# Patient Record
(demographics unavailable — no encounter records)

---

## 2024-10-21 NOTE — HISTORY OF PRESENT ILLNESS
[de-identified] : Patient is a 59-year-old male who presents today for an evaluation of both his left and right knee.  This is all stems from a Worker's Comp. injury for which she sustained injuries.  He underwent a right total knee replacement on January 4, 2020.  He also has some discomfort within his left knee.  On his last office visit he was referred for an MRI.  He states that he still feels discomfort mostly with any strenuous activities and does feel some crepitus at times.  Lots of the right knee.  He states that he feels an increase discomfort with a clicking sensation and feeling of giving way.  He denies any specific injury or accident.  Presents today for evaluation of both knees.

## 2024-10-21 NOTE — PHYSICAL EXAM
[Antalgic] : antalgic [LE] : Sensory: Intact in bilateral lower extremities [ALL] : dorsalis pedis, posterior tibial, femoral, popliteal, and radial 2+ and symmetric bilaterally [de-identified] : On physical examination of the left knee.  The skin is clean dry and intact with no areas of redness swelling or heat noted.  There is some diffuse pain and tenderness particularly in the patellofemoral as well as the medial femoral-tibial compartment.  He does have good range of motion neurovascularly intact with no evidence of ligamentous laxity.  There is no evidence of any motor or sensory deficit.  Patient has good distal pulses with no calf tenderness.  On physical examination of the right knee.  Patient is status post right total knee replacement.  There is a well-healed surgical scar with no evidence of infection superficial or deep.  There is no redness heat discharge fever noted.  There is some diffuse pain and tenderness primarily in the patellofemoral compartment as well as along the medial femoral-tibial compartment.  He does have adequate range of motion and is neurovascularly intact.  No evidence of ligamentous laxity however there is some crepitus upon evaluation with a clicking sensation.  There is no evidence of any motor or sensory deficit.  Patient has good distal pulses and no calf tenderness. [de-identified] : X-rays of the right knee were taken today. This includes 3 views. The knee AP, lateral anbd sunrise views. They reveal a status post right total knee replacement.  The hardware is in place and shows excellent alignment as well as fixation. There is no evidence of any fracture, dislocation or loosening of any hardware.

## 2024-10-21 NOTE — PHYSICAL EXAM
[Antalgic] : antalgic [LE] : Sensory: Intact in bilateral lower extremities [ALL] : dorsalis pedis, posterior tibial, femoral, popliteal, and radial 2+ and symmetric bilaterally [de-identified] : On physical examination of the left knee.  The skin is clean dry and intact with no areas of redness swelling or heat noted.  There is some diffuse pain and tenderness particularly in the patellofemoral as well as the medial femoral-tibial compartment.  He does have good range of motion neurovascularly intact with no evidence of ligamentous laxity.  There is no evidence of any motor or sensory deficit.  Patient has good distal pulses with no calf tenderness.  On physical examination of the right knee.  Patient is status post right total knee replacement.  There is a well-healed surgical scar with no evidence of infection superficial or deep.  There is no redness heat discharge fever noted.  There is some diffuse pain and tenderness primarily in the patellofemoral compartment as well as along the medial femoral-tibial compartment.  He does have adequate range of motion and is neurovascularly intact.  No evidence of ligamentous laxity however there is some crepitus upon evaluation with a clicking sensation.  There is no evidence of any motor or sensory deficit.  Patient has good distal pulses and no calf tenderness. [de-identified] : X-rays of the right knee were taken today. This includes 3 views. The knee AP, lateral anbd sunrise views. They reveal a status post right total knee replacement.  The hardware is in place and shows excellent alignment as well as fixation. There is no evidence of any fracture, dislocation or loosening of any hardware.

## 2024-10-21 NOTE — DISCUSSION/SUMMARY
[de-identified] : After thorough history full examination and review of the x-rays.  It was explained to the patient he is doing fairly well in both his left and right knee.  And that the prosthesis is in place with good alignment and fixation.  However due to his complaints of discomfort.  It was suggested that he receive a MRI.  This is to evaluate the prosthesis but also to evaluate the surrounding soft tissue.  He is encouraged to continue with exercises along with ice pack last moist heat and anti-inflammatories as needed.  And to return back to the office once the MRI is performed to discuss its findings.  35 minutes were spent, face to face, in direct consultation with the patient. This includes reviewing the natural history of their Dx., eliciting the history, performing an orthopedic exam, review of the x-ray findings, forming a differential Dx and discussing all treatment options. This Includes both surgical and non-surgical treatments. I also reviewed all the risks and benefits of non-operative & operative Tx options, future impact into orthopedic functions/problems, activity restrictions both at home and at work, and all follow up requirements.

## 2024-10-21 NOTE — DISCUSSION/SUMMARY
[de-identified] : After thorough history full examination and review of the x-rays.  It was explained to the patient he is doing fairly well in both his left and right knee.  And that the prosthesis is in place with good alignment and fixation.  However due to his complaints of discomfort.  It was suggested that he receive a MRI.  This is to evaluate the prosthesis but also to evaluate the surrounding soft tissue.  He is encouraged to continue with exercises along with ice pack last moist heat and anti-inflammatories as needed.  And to return back to the office once the MRI is performed to discuss its findings.  35 minutes were spent, face to face, in direct consultation with the patient. This includes reviewing the natural history of their Dx., eliciting the history, performing an orthopedic exam, review of the x-ray findings, forming a differential Dx and discussing all treatment options. This Includes both surgical and non-surgical treatments. I also reviewed all the risks and benefits of non-operative & operative Tx options, future impact into orthopedic functions/problems, activity restrictions both at home and at work, and all follow up requirements.

## 2024-10-21 NOTE — HISTORY OF PRESENT ILLNESS
[de-identified] : Patient is a 59-year-old male who presents today for an evaluation of both his left and right knee.  This is all stems from a Worker's Comp. injury for which she sustained injuries.  He underwent a right total knee replacement on January 4, 2020.  He also has some discomfort within his left knee.  On his last office visit he was referred for an MRI.  He states that he still feels discomfort mostly with any strenuous activities and does feel some crepitus at times.  Lots of the right knee.  He states that he feels an increase discomfort with a clicking sensation and feeling of giving way.  He denies any specific injury or accident.  Presents today for evaluation of both knees.

## 2025-03-04 NOTE — PHYSICAL EXAM
[Antalgic] : antalgic [LE] : Sensory: Intact in bilateral lower extremities [ALL] : dorsalis pedis, posterior tibial, femoral, popliteal, and radial 2+ and symmetric bilaterally [Normal RUE] : Right Upper Extremity: No scars, rashes, lesions, ulcers, skin intact [Normal LUE] : Left Upper Extremity: No scars, rashes, lesions, ulcers, skin intact [Normal RLE] : Right Lower Extremity: No scars, rashes, lesions, ulcers, skin intact [Normal LLE] : Left Lower Extremity: No scars, rashes, lesions, ulcers, skin intact [Normal] : No costovertebral angle tenderness and no spinal tenderness [de-identified] : On physical examination of the right knee.  Patient is status post right total knee replacement on January 4, 2023.  There is a well-healed surgical scar with no evidence of infection superficial or deep.  There is no redness swelling heat discharge noted.  There is some diffuse pain and tenderness primarily in the patellofemoral compartment.  He does have adequate range of motion but with discomfort.  Range of motion 0-1 35.  Patient does have some significant laxity to varus valgus stresses in extension and flexion.  Slight anterior drawer.  Calves are soft, nontender, no evidence of DVT at this time.  Patient is good motor and sensory to both leg.   [de-identified] : No x-rays were performed at today's office visit  MRI review does not show any significant periprosthetic issues.  Good overall alignment no significant effusion.  No synovitis

## 2025-03-04 NOTE — HISTORY OF PRESENT ILLNESS
[de-identified] : Patient is a 59-year-old male who presents today for right knee MRI review.  This is all due to from a Worker's Comp. injury that he sustained on October 8, 2021.  He is status post right total knee replacement on January 4, 2023.  Overall he does feel some discomfort to the right knee.  Which is worse with any strenuous activities.  This includes stair climbing as well as prolonged standing and walking.  Although he does feel an improvement following his knee replacement.  He states that stiffness still occurs in his right.  With a feeling of giving way at times on his left.  The patient does say that he has fallen a few times.  We did get an MRI and is here for review. [FreeTextEntry1] : Intermittent swelling, instability

## 2025-03-04 NOTE — DISCUSSION/SUMMARY
[Medication Risks Reviewed] : Medication risks reviewed [Surgical risks reviewed] : Surgical risks reviewed [de-identified] : The patient is status post right total knee replacement January 4, 2023.  According to the physical exam patient does seem very lax both in extension and flexion.  Patient does report instability to the knee and lately, frequent falls.  In my opinion patient would benefit from revision right total knee replacement with changing the bearing, lysis of adhesions/scar tissue removal in and around the patella component, revision of the femur so we would be able to use a bearing with a V VC plus for stability.  The risks, benefits, alternatives, and immediate goals of revision surgery were gone over the patient in depth and at length.  Patient understands the multiple risks including and not limited to implant related, medical related, infectious related, and rehabilitative related all of which she would be willing to accept.  All of the patients questions were thoroughly answered to his satisfaction.  Patient will book right revision surgery at his earliest convenience.

## 2025-06-10 NOTE — HISTORY OF PRESENT ILLNESS
[___ Weeks Post Op] : [unfilled] weeks post op [Clean/Dry/Intact] : clean, dry and intact [Healed] : healed [Erythema] : not erythematous [Discharge] : absent of discharge [Dehiscence] : not dehisced [Swelling] : not swollen [Slow Progress] : is progressing slowly [Excellent Pain Control] : has excellent pain control [No Sign of Infection] : is showing no signs of infection [de-identified] : Revision Right TKR 5/28/25 [de-identified] : AP lateral sunrise view of the right knee was performed today x-rays do reveal well-placed revision femoral stem without signs of fracture tumor or infection there is 1 cable noted around the condylar of the femur.   [de-identified] : 60-year-old man presents for 2-week postop visit.  He is status post a right revision total knee.  Revision included a femoral component exchange with a stem and a constrained bearing.  Patient is tolerating home PT and will begin outpatient physical therapy this week.  He is ambulating with a walker.  He is using minimal narcotics. [de-identified] :  patient will return in 6 weeks for an 8-week appointment.  He was provided with an updated prescription for outpatient physical therapy.

## 2025-07-11 NOTE — HISTORY OF PRESENT ILLNESS
[___ Months Post Op] : [unfilled] months post op [Clean/Dry/Intact] : clean, dry and intact [Healed] : healed [Neuro Intact] : an unremarkable neurological exam [Vascular Intact] : ~T peripheral vascular exam normal [Negative Tamara's] : maneuvers demonstrated a negative Tamara's sign [No Sign of Infection] : is showing no signs of infection [Chills] : no chills [Constipation] : no constipation [Diarrhea] : no diarrhea [Dysuria] : no dysuria [Fever] : no fever [Nausea] : no nausea [Vomiting] : no vomiting [Erythema] : not erythematous [Discharge] : absent of discharge [Dehiscence] : not dehisced [de-identified] : S/P revision right TKR DOS. 5/28/25 [de-identified] : Patient is a 60-year-old male who presents today for an evaluation regarding his right knee.  He is status post revision right total knee replacement on May 28, 2025.  He states that he is doing well but does still have pain swelling and stiffness.  He states that he does take the ibuprofen and the Tylenol as well as the oxycodones when needed.  And does receive physical therapy 3 times a week.  He states that the discomfort is mostly with any strenuous activities and is mostly associated due to the fact that he does feel stiff.  He denies any history of new or recent injury however this all stems from a Worker's Comp. injury. [de-identified] : On physical examination of the right knee.  Patient is status post right total knee replacement.  There is a well-healed surgical scar with no evidence of infection superficial or deep.  There is no redness, swelling, heat, discharge or ecchymosis noted.  There is no pain or tenderness on examination.  The patient has good range of motion both passively and actively. As they are able to fully extend the knee with good flexion.  Patient also has good strength with range of motion against resistance.  There is no evidence of ligamentous laxity.  As this was tested in anterior posterior drawer test as well as varus and valgus stress testing.  There is no evidence of muscle atrophy or palpable defect.  No evidence of any motor or sensory deficit.  Patient has good distal pulses with no calf tenderness.  Patient has a range of motion from 0 to approximately 50 degrees.  But states that with physical therapy he is able to achieve 85 [de-identified] : X-rays of the right knee were taken in the office today. This includes all 3 views. The AP, lateral and sunrise views. They each reveal a status post revision right total knee replacement.  The hardware is placed perfectly and shows excellent alignment as well as fixation. There is no evidence of any fracture, dislocation or loosening of any hardware. [de-identified] : Status post revision of a right total knee replacement on May 28, 2025 [de-identified] : After thorough history full examination and review of the x-rays.  He was assured that the prosthesis is in good place with good alignment and fixation.  However he does still present with significant decreased range of motion.  He is advised to continue with conservative management including therapy exercises and anti-inflammatories, pain medication tramadol.  He was explained that he needs to push to achieve a better range of motion to be satisfied with his knee replacement.  He was explained in the office various exercises as well as the need to improve his complaints.  He is advised to return back to the office in another month or 2 for reevaluation and management.  However if he experiences any increase in redness swelling heat pain discomfort to notify the office sooner.  35 minutes were spent, face to face, in direct consultation with the patient. This includes reviewing the natural history of their Dx., eliciting the history, performing an orthopedic exam, review of the x-ray findings, forming a differential Dx and discussing all treatment options. This Includes both surgical and non-surgical treatments. I also reviewed all the risks and benefits of non-operative & operative Tx options, future impact into orthopedic functions/problems, activity restrictions both at home and at work, and all follow up requirements.

## 2025-07-11 NOTE — HISTORY OF PRESENT ILLNESS
[___ Months Post Op] : [unfilled] months post op [Clean/Dry/Intact] : clean, dry and intact [Healed] : healed [Neuro Intact] : an unremarkable neurological exam [Vascular Intact] : ~T peripheral vascular exam normal [Negative Tamara's] : maneuvers demonstrated a negative Tamara's sign [No Sign of Infection] : is showing no signs of infection [Chills] : no chills [Constipation] : no constipation [Diarrhea] : no diarrhea [Dysuria] : no dysuria [Fever] : no fever [Nausea] : no nausea [Vomiting] : no vomiting [Erythema] : not erythematous [Discharge] : absent of discharge [Dehiscence] : not dehisced [de-identified] : S/P revision right TKR DOS. 5/28/25 [de-identified] : Patient is a 60-year-old male who presents today for an evaluation regarding his right knee.  He is status post revision right total knee replacement on May 28, 2025.  He states that he is doing well but does still have pain swelling and stiffness.  He states that he does take the ibuprofen and the Tylenol as well as the oxycodones when needed.  And does receive physical therapy 3 times a week.  He states that the discomfort is mostly with any strenuous activities and is mostly associated due to the fact that he does feel stiff.  He denies any history of new or recent injury however this all stems from a Worker's Comp. injury. [de-identified] : On physical examination of the right knee.  Patient is status post right total knee replacement.  There is a well-healed surgical scar with no evidence of infection superficial or deep.  There is no redness, swelling, heat, discharge or ecchymosis noted.  There is no pain or tenderness on examination.  The patient has good range of motion both passively and actively. As they are able to fully extend the knee with good flexion.  Patient also has good strength with range of motion against resistance.  There is no evidence of ligamentous laxity.  As this was tested in anterior posterior drawer test as well as varus and valgus stress testing.  There is no evidence of muscle atrophy or palpable defect.  No evidence of any motor or sensory deficit.  Patient has good distal pulses with no calf tenderness.  Patient has a range of motion from 0 to approximately 50 degrees.  But states that with physical therapy he is able to achieve 85 [de-identified] : X-rays of the right knee were taken in the office today. This includes all 3 views. The AP, lateral and sunrise views. They each reveal a status post revision right total knee replacement.  The hardware is placed perfectly and shows excellent alignment as well as fixation. There is no evidence of any fracture, dislocation or loosening of any hardware. [de-identified] : Status post revision of a right total knee replacement on May 28, 2025 [de-identified] : After thorough history full examination and review of the x-rays.  He was assured that the prosthesis is in good place with good alignment and fixation.  However he does still present with significant decreased range of motion.  He is advised to continue with conservative management including therapy exercises and anti-inflammatories, pain medication tramadol.  He was explained that he needs to push to achieve a better range of motion to be satisfied with his knee replacement.  He was explained in the office various exercises as well as the need to improve his complaints.  He is advised to return back to the office in another month or 2 for reevaluation and management.  However if he experiences any increase in redness swelling heat pain discomfort to notify the office sooner.  35 minutes were spent, face to face, in direct consultation with the patient. This includes reviewing the natural history of their Dx., eliciting the history, performing an orthopedic exam, review of the x-ray findings, forming a differential Dx and discussing all treatment options. This Includes both surgical and non-surgical treatments. I also reviewed all the risks and benefits of non-operative & operative Tx options, future impact into orthopedic functions/problems, activity restrictions both at home and at work, and all follow up requirements.

## 2025-07-11 NOTE — HISTORY OF PRESENT ILLNESS
[___ Months Post Op] : [unfilled] months post op [Clean/Dry/Intact] : clean, dry and intact [Healed] : healed [Neuro Intact] : an unremarkable neurological exam [Vascular Intact] : ~T peripheral vascular exam normal [Negative Tamara's] : maneuvers demonstrated a negative Tamara's sign [No Sign of Infection] : is showing no signs of infection [Chills] : no chills [Constipation] : no constipation [Diarrhea] : no diarrhea [Dysuria] : no dysuria [Fever] : no fever [Nausea] : no nausea [Vomiting] : no vomiting [Erythema] : not erythematous [Discharge] : absent of discharge [Dehiscence] : not dehisced [de-identified] : S/P revision right TKR DOS. 5/28/25 [de-identified] : Patient is a 60-year-old male who presents today for an evaluation regarding his right knee.  He is status post revision right total knee replacement on May 28, 2025.  He states that he is doing well but does still have pain swelling and stiffness.  He states that he does take the ibuprofen and the Tylenol as well as the oxycodones when needed.  And does receive physical therapy 3 times a week.  He states that the discomfort is mostly with any strenuous activities and is mostly associated due to the fact that he does feel stiff.  He denies any history of new or recent injury however this all stems from a Worker's Comp. injury. [de-identified] : On physical examination of the right knee.  Patient is status post right total knee replacement.  There is a well-healed surgical scar with no evidence of infection superficial or deep.  There is no redness, swelling, heat, discharge or ecchymosis noted.  There is no pain or tenderness on examination.  The patient has good range of motion both passively and actively. As they are able to fully extend the knee with good flexion.  Patient also has good strength with range of motion against resistance.  There is no evidence of ligamentous laxity.  As this was tested in anterior posterior drawer test as well as varus and valgus stress testing.  There is no evidence of muscle atrophy or palpable defect.  No evidence of any motor or sensory deficit.  Patient has good distal pulses with no calf tenderness.  Patient has a range of motion from 0 to approximately 50 degrees.  But states that with physical therapy he is able to achieve 85 [de-identified] : X-rays of the right knee were taken in the office today. This includes all 3 views. The AP, lateral and sunrise views. They each reveal a status post revision right total knee replacement.  The hardware is placed perfectly and shows excellent alignment as well as fixation. There is no evidence of any fracture, dislocation or loosening of any hardware. [de-identified] : Status post revision of a right total knee replacement on May 28, 2025 [de-identified] : After thorough history full examination and review of the x-rays.  He was assured that the prosthesis is in good place with good alignment and fixation.  However he does still present with significant decreased range of motion.  He is advised to continue with conservative management including therapy exercises and anti-inflammatories, pain medication tramadol.  He was explained that he needs to push to achieve a better range of motion to be satisfied with his knee replacement.  He was explained in the office various exercises as well as the need to improve his complaints.  He is advised to return back to the office in another month or 2 for reevaluation and management.  However if he experiences any increase in redness swelling heat pain discomfort to notify the office sooner.  35 minutes were spent, face to face, in direct consultation with the patient. This includes reviewing the natural history of their Dx., eliciting the history, performing an orthopedic exam, review of the x-ray findings, forming a differential Dx and discussing all treatment options. This Includes both surgical and non-surgical treatments. I also reviewed all the risks and benefits of non-operative & operative Tx options, future impact into orthopedic functions/problems, activity restrictions both at home and at work, and all follow up requirements.

## 2025-07-29 NOTE — HISTORY OF PRESENT ILLNESS
[___ Months Post Op] : [unfilled] months post op [6] : the patient reports pain that is 6/10 in severity [Chills] : no chills [Constipation] : no constipation [Diarrhea] : no diarrhea [Dysuria] : no dysuria [Fever] : no fever [Nausea] : no nausea [Vomiting] : no vomiting [Clean/Dry/Intact] : clean, dry and intact [Healed] : healed [Erythema] : not erythematous [Discharge] : absent of discharge [Dehiscence] : not dehisced [Neuro Intact] : an unremarkable neurological exam [Vascular Intact] : ~T peripheral vascular exam normal [Negative Tamara's] : maneuvers demonstrated a negative Tamara's sign [Xray (Date:___)] : [unfilled] Xray -  [Hardware in Good Position] : hardware in good position [No Obvious Fractures] : no obvious fractures [Good Overall Alignment] : good overall alignment [No Sign of Infection] : is showing no signs of infection [de-identified] : S/P revision right TKR DOS. 5/28/25 [de-identified] : Patient is a 60-year-old male who presents today for an evaluation regarding his right knee.  He is status post revision right total knee replacement on May 28, 2025.  He states that he is doing well but does still have pain swelling and stiffness.  He states that he does take the ibuprofen and the Tylenol as well as the tramadol when needed.  And does receive physical therapy 3 times a week.  He states that the discomfort is mostly with any strenuous activities and is mostly associated due to the fact that he does feel stiff.  He denies any history of new or recent injury however this all stems from a Worker's Comp. injury. [de-identified] : On physical examination of the right knee.  Patient is status post right total knee replacement.  There is a well-healed surgical scar with no evidence of infection superficial or deep.  There is no redness, swelling, heat, discharge or ecchymosis noted.  There is no pain or tenderness on examination.  The patient has good range of motion both passively and actively. As they are able to fully extend the knee with good flexion.  Patient also has good strength with range of motion against resistance.  There is no evidence of ligamentous laxity.  As this was tested in anterior posterior drawer test as well as varus and valgus stress testing.  There is no evidence of muscle atrophy or palpable defect.  No evidence of any motor or sensory deficit.  Patient has good distal pulses with no calf tenderness.  Patient has a range of motion from 0 to approximately 95 degrees.  But states that with physical therapy he is able to achieve 100 [de-identified] : X-rays of the right knee were taken in the office today. This includes all 3 views. The AP, lateral and sunrise views. They each reveal a status post revision right total knee replacement.  The hardware is placed perfectly and shows excellent alignment as well as fixation. There is no evidence of any fracture, dislocation or loosening of any hardware. [de-identified] : Status post revision of a right total knee replacement on May 28, 2025 [de-identified] : After thorough history full examination and review of the x-rays.  He was assured that the prosthesis is in good place with good alignment and fixation.  However he does still present with significant decreased range of motion.  He is advised to continue with conservative management including therapy exercises and anti-inflammatories, pain medication tramadol.  He was explained that he needs to push to achieve a better range of motion to be satisfied with his knee replacement.  He was explained in the office various exercises as well as the need to improve his complaints.  He is advised to return back to the office in another month or 2 for reevaluation and management.  However if he experiences any increase in redness swelling heat pain discomfort to notify the office sooner.  35 minutes were spent, face to face, in direct consultation with the patient. This includes reviewing the natural history of their Dx., eliciting the history, performing an orthopedic exam, review of the x-ray findings, forming a differential Dx and discussing all treatment options. This Includes both surgical and non-surgical treatments. I also reviewed all the risks and benefits of non-operative & operative Tx options, future impact into orthopedic functions/problems, activity restrictions both at home and at work, and all follow up requirements.